# Patient Record
Sex: FEMALE | Race: WHITE | Employment: FULL TIME | URBAN - METROPOLITAN AREA
[De-identification: names, ages, dates, MRNs, and addresses within clinical notes are randomized per-mention and may not be internally consistent; named-entity substitution may affect disease eponyms.]

---

## 2018-10-01 ENCOUNTER — HOSPITAL ENCOUNTER (OUTPATIENT)
Age: 53
Setting detail: SPECIMEN
Discharge: HOME | End: 2018-10-01
Payer: COMMERCIAL

## 2018-10-01 PROCEDURE — 81371 HLA I & II TYPE VERIFY LR: CPT

## 2018-10-26 ENCOUNTER — HOSPITAL ENCOUNTER (OUTPATIENT)
Dept: ONCOLOGY | Age: 53
Setting detail: INFUSION SERIES
Discharge: HOME | End: 2018-10-26
Payer: COMMERCIAL

## 2018-10-26 LAB
ABO/RH: NORMAL
ANTIBODY SCREEN: NORMAL

## 2018-10-26 PROCEDURE — 86901 BLOOD TYPING SEROLOGIC RH(D): CPT

## 2018-10-26 PROCEDURE — 81378 HLA I & II TYPING HR: CPT

## 2018-10-26 PROCEDURE — 86644 CMV ANTIBODY: CPT

## 2018-10-26 PROCEDURE — 99201 HC NEW PT, OUTPT VISIT LEVEL 1: CPT

## 2018-10-26 PROCEDURE — 86833 HLA CLASS II HIGH DEFIN QUAL: CPT

## 2018-10-26 PROCEDURE — 86832 HLA CLASS I HIGH DEFIN QUAL: CPT

## 2018-10-26 PROCEDURE — 86900 BLOOD TYPING SEROLOGIC ABO: CPT

## 2018-10-26 PROCEDURE — 86850 RBC ANTIBODY SCREEN: CPT

## 2018-10-26 NOTE — ONCOLOGY
Sib donor here for type and screen, CMV IgG and KIR/ATB typing to [place]. Reviewed methods of collecting stem cells and HLA typing.

## 2018-10-28 LAB — CYTOMEGALOVIRUS IGG ANTIBODY: 1.4 U/ML
